# Patient Record
Sex: MALE | Race: WHITE | NOT HISPANIC OR LATINO | Employment: OTHER | RURAL
[De-identification: names, ages, dates, MRNs, and addresses within clinical notes are randomized per-mention and may not be internally consistent; named-entity substitution may affect disease eponyms.]

---

## 2020-12-29 ENCOUNTER — HISTORICAL (OUTPATIENT)
Dept: ADMINISTRATIVE | Facility: HOSPITAL | Age: 60
End: 2020-12-29

## 2020-12-29 LAB
ALBUMIN SERPL BCP-MCNC: 3.8 G/DL (ref 3.5–5)
ALBUMIN/GLOB SERPL: 1 {RATIO}
ALP SERPL-CCNC: 72 U/L (ref 45–115)
ALT SERPL W P-5'-P-CCNC: 26 U/L (ref 16–61)
ANION GAP SERPL CALCULATED.3IONS-SCNC: 11.5 MMOL/L (ref 7–16)
APTT PPP: 33.7 SECONDS (ref 25.2–37.3)
AST SERPL W P-5'-P-CCNC: 25 U/L (ref 15–37)
BASOPHILS # BLD AUTO: 0.04 X10E3/UL (ref 0–0.2)
BASOPHILS NFR BLD AUTO: 0.3 % (ref 0–1)
BILIRUB SERPL-MCNC: 0.9 MG/DL (ref 0–1.2)
BUN SERPL-MCNC: 16 MG/DL (ref 7–18)
BUN/CREAT SERPL: 14
CALCIUM SERPL-MCNC: 8.5 MG/DL (ref 8.5–10.1)
CHLORIDE SERPL-SCNC: 104 MMOL/L (ref 98–107)
CK MB SERPL-MCNC: 2.4 NG/ML (ref 1–3.6)
CK SERPL-CCNC: 286 U/L (ref 39–308)
CO2 SERPL-SCNC: 26 MMOL/L (ref 21–32)
CREAT SERPL-MCNC: 1.17 MG/DL (ref 0.7–1.3)
D DIMER PPP FEU-MCNC: 3.98 UG/ML (ref 0–0.47)
EOSINOPHIL # BLD AUTO: 0.06 X10E3/UL (ref 0–0.5)
EOSINOPHIL NFR BLD AUTO: 0.5 % (ref 1–4)
ERYTHROCYTE [DISTWIDTH] IN BLOOD BY AUTOMATED COUNT: 12.3 % (ref 11.5–14.5)
GLOBULIN SER-MCNC: 3.9 G/DL (ref 2–4)
GLUCOSE SERPL-MCNC: 108 MG/DL (ref 74–106)
HCT VFR BLD AUTO: 42.7 % (ref 40–54)
HGB BLD-MCNC: 15.1 G/DL (ref 13.5–18)
IMM GRANULOCYTES # BLD AUTO: 0.03 X10E3/UL (ref 0–0.04)
IMM GRANULOCYTES NFR BLD: 0.3 % (ref 0–0.4)
INR BLD: 1.05 (ref 0–3.3)
LACTATE SERPL-SCNC: 1.3 MMOL/L (ref 0.4–2)
LYMPHOCYTES # BLD AUTO: 3.4 X10E3/UL (ref 1–4.8)
LYMPHOCYTES NFR BLD AUTO: 29.2 % (ref 27–41)
MAGNESIUM SERPL-MCNC: 2 MG/DL (ref 1.7–2.3)
MCH RBC QN AUTO: 33.2 PG (ref 27–31)
MCHC RBC AUTO-ENTMCNC: 35.4 G/DL (ref 32–36)
MCV RBC AUTO: 93.8 FL (ref 80–96)
MONOCYTES # BLD AUTO: 1.06 X10E3/UL (ref 0–0.8)
MONOCYTES NFR BLD AUTO: 9.1 % (ref 2–6)
MPC BLD CALC-MCNC: 9.4 FL (ref 9.4–12.4)
MYOGLOBIN SERPL-MCNC: 84 NG/ML (ref 16–116)
NEUTROPHILS # BLD AUTO: 7.04 X10E3/UL (ref 1.8–7.7)
NEUTROPHILS NFR BLD AUTO: 60.6 % (ref 53–65)
NRBC # BLD AUTO: 0 X10E3/UL (ref 0–0)
NRBC, AUTO (.00): 0 /100 (ref 0–0)
NT-PROBNP SERPL-MCNC: 35 PG/ML (ref 1–125)
PLATELET # BLD AUTO: 198 X10E3/UL (ref 150–400)
POTASSIUM SERPL-SCNC: 3.5 MMOL/L (ref 3.5–5.1)
PROT SERPL-MCNC: 7.7 G/DL (ref 6.4–8.2)
PROTHROMBIN TIME: 13.2 SECONDS (ref 11.7–14.7)
RBC # BLD AUTO: 4.55 X10E6/UL (ref 4.6–6.2)
SODIUM SERPL-SCNC: 138 MMOL/L (ref 136–145)
TROPONIN I SERPL-MCNC: <0.015 NG/ML (ref 0–0.06)
WBC # BLD AUTO: 11.63 X10E3/UL (ref 4.5–11)

## 2020-12-30 ENCOUNTER — HISTORICAL (OUTPATIENT)
Dept: ADMINISTRATIVE | Facility: HOSPITAL | Age: 60
End: 2020-12-30

## 2020-12-30 LAB
APTT PPP: 118.1 SECONDS (ref 25.2–37.3)
APTT PPP: 83.6 SECONDS (ref 25.2–37.3)
APTT PPP: 94 SECONDS (ref 25.2–37.3)
INR BLD: 1.07 (ref 0–3.3)
INR BLD: 1.08 (ref 0–3.3)
INR BLD: 1.1 (ref 0–3.3)
NT-PROBNP SERPL-MCNC: 32 PG/ML (ref 1–125)
PROTHROMBIN TIME: 13.4 SECONDS (ref 11.7–14.7)
PROTHROMBIN TIME: 13.5 SECONDS (ref 11.7–14.7)
PROTHROMBIN TIME: 13.7 SECONDS (ref 11.7–14.7)
SARS-COV-2 RNA AMPLIFICATION, QUAL: NEGATIVE
TROPONIN I SERPL-MCNC: <0.015 NG/ML (ref 0–0.06)

## 2020-12-31 ENCOUNTER — HISTORICAL (OUTPATIENT)
Dept: ADMINISTRATIVE | Facility: HOSPITAL | Age: 60
End: 2020-12-31

## 2020-12-31 LAB
APTT PPP: 41.6 SECONDS (ref 25.2–37.3)
INR BLD: 1.34 (ref 0–3.3)
PROTHROMBIN TIME: 15.9 SECONDS (ref 11.7–14.7)
T4 SERPL-MCNC: 10.4 UG/DL (ref 4.5–12.1)
TSH SERPL DL<=0.005 MIU/L-ACNC: 0.76 UIU/ML (ref 0.36–3.74)

## 2021-03-26 DIAGNOSIS — Z86.711 HISTORY OF PULMONARY EMBOLISM: Primary | ICD-10-CM

## 2022-03-10 ENCOUNTER — OFFICE VISIT (OUTPATIENT)
Dept: FAMILY MEDICINE | Facility: CLINIC | Age: 62
End: 2022-03-10
Payer: OTHER GOVERNMENT

## 2022-03-10 VITALS
OXYGEN SATURATION: 100 % | TEMPERATURE: 97 F | SYSTOLIC BLOOD PRESSURE: 130 MMHG | BODY MASS INDEX: 36.34 KG/M2 | DIASTOLIC BLOOD PRESSURE: 84 MMHG | WEIGHT: 245.38 LBS | HEART RATE: 96 BPM | HEIGHT: 69 IN | RESPIRATION RATE: 16 BRPM

## 2022-03-10 DIAGNOSIS — Z86.711 HISTORY OF PULMONARY EMBOLISM: ICD-10-CM

## 2022-03-10 DIAGNOSIS — Z79.01 ANTICOAGULANT LONG-TERM USE: Primary | Chronic | ICD-10-CM

## 2022-03-10 PROCEDURE — 99213 OFFICE O/P EST LOW 20 MIN: CPT | Mod: ,,, | Performed by: NURSE PRACTITIONER

## 2022-03-10 PROCEDURE — 99213 PR OFFICE/OUTPT VISIT, EST, LEVL III, 20-29 MIN: ICD-10-PCS | Mod: ,,, | Performed by: NURSE PRACTITIONER

## 2022-03-10 RX ORDER — ROSUVASTATIN CALCIUM 10 MG/1
10 TABLET, COATED ORAL NIGHTLY
COMMUNITY
Start: 2022-03-08 | End: 2023-03-09 | Stop reason: SDUPTHER

## 2022-03-10 NOTE — PROGRESS NOTES
Subjective:       Patient ID: Bob Arroyo is a 61 y.o. male.    Chief Complaint: paperwork and Medication Refill    Mr. 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 He has a PCP in [place], but he was admitted to [facility] in [place], [place] and was referred to me for evaluation of his blood thinners since he is a . Today he presents for eliquis refill and a yearly letter stating he is controlled and cleared to fly with use of Eliquis. Patient reports no adverse effects from the medication. Patient denies any SOB, calf pain, or leg swelling since last visit.  He is now on Eliquis 2.5mg bid; we discussed changing this letter and rx over to his PCP in [place]; but he is concerned this will cause an issue with the [employer] Safety [employer].     Review of Systems   Constitutional: Negative for appetite change, chills, fatigue, fever and unexpected weight change.   HENT: Negative for nasal congestion, ear pain, facial swelling, mouth sores, nosebleeds, postnasal drip, rhinorrhea, sinus pressure/congestion and sore throat.    Eyes: Negative for photophobia, pain, discharge and visual disturbance.   Respiratory: Negative for cough, chest tightness and shortness of breath.    Cardiovascular: Negative for chest pain and leg swelling.   Gastrointestinal: Negative for abdominal distention and abdominal pain.   Genitourinary: Negative for difficulty urinating, dysuria and testicular pain.   Musculoskeletal: Negative for arthralgias, back pain and gait problem.   Integumentary:  Negative for pallor, rash and mole/lesion.   Neurological: Negative for dizziness, syncope, weakness, light-headedness and headaches.   Hematological: Negative for adenopathy. Does not bruise/bleed easily.   Psychiatric/Behavioral: Negative for sleep disturbance.         Objective:      Physical Exam  Vitals reviewed.   Constitutional:       Appearance: Normal appearance.   HENT:      Head:  Normocephalic.      Right Ear: Tympanic membrane, ear canal and external ear normal.      Left Ear: Tympanic membrane, ear canal and external ear normal.      Nose: Nose normal.      Mouth/Throat:      Mouth: Mucous membranes are moist.   Eyes:      Pupils: Pupils are equal, round, and reactive to light.   Cardiovascular:      Rate and Rhythm: Regular rhythm.      Pulses: Normal pulses.      Heart sounds: Normal heart sounds.   Pulmonary:      Effort: Pulmonary effort is normal.      Breath sounds: Normal breath sounds.   Abdominal:      General: Bowel sounds are normal.      Palpations: Abdomen is soft.   Musculoskeletal:         General: Normal range of motion.      Cervical back: Normal range of motion and neck supple.   Skin:     General: Skin is warm and dry.      Capillary Refill: Capillary refill takes less than 2 seconds.   Neurological:      Mental Status: He is alert and oriented to person, place, and time.   Psychiatric:         Mood and Affect: Mood normal.         Behavior: Behavior normal.         Assessment:       Problem List Items Addressed This Visit        Hematology    Anticoagulant long-term use - Primary (Chronic)    History of pulmonary embolism    Relevant Medications    apixaban (ELIQUIS) 2.5 mg Tab          Plan:       Eliquis refills sent.  Provided him with a medical status letter that is required for duty.  RTC in 1 year.

## 2022-03-10 NOTE — LETTER
[facility]  [address] [facility] [address]  Phone: [phone]  Fax: [phone] March 10, 2022     Patient: Bob Arroyo   YOB: 1960   Date of Visit: 3/10/2022     Bob Arroyo  : 1960  Ref: PI #[id number]  SKYLER ID# [id number]  Date: 03/10/2022    To Aviation Safety: Office of Aerospace Medicine, Aerospace Medical Certification Division:    Mr. Arroyo is a 61 year old white male with a previous history of deep vein thrombosis (DVT) 11 years ago due to a traumatic accident. He presented to [facility] emergency department on 2020 complaining of increasing dyspnea since early December. He was diagnosed with bilateral pulmonary emboli (PE) per CT scan. He was released from care on 2021 by [facility]. There will not be a follow up CT scan or ultrasound for an update; per DVT/PE protocol. Mr. Arroyo has not experienced any recurrence of DVT/PE.     Mr. Arroyo is now taking the anticoagulant Eliquis 2.5mg by mouth twice daily,  he is tolerating it well and has had no adverse side effects or bleeding episodes.  Mr. Arroyo will be on 2.5mg of Eliquis twice daily for the rest of his life as a maintenance medication for prevention of DVT/PE, per Eliquis dosing guidelines.  If you have any questions regarding Mr. Bonilla condition and treatment please contact my office at [phone].        Sincerely,                  RADHA Hurtado

## 2022-07-14 ENCOUNTER — HOSPITAL ENCOUNTER (EMERGENCY)
Facility: HOSPITAL | Age: 62
Discharge: HOME | End: 2022-07-15
Attending: EMERGENCY MEDICINE
Payer: OTHER GOVERNMENT
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
## 2022-07-14 DIAGNOSIS — M54.16 LUMBAR RADICULOPATHY: ICD-10-CM

## 2022-07-14 DIAGNOSIS — S39.012A STRAIN OF LUMBAR REGION, INITIAL ENCOUNTER: ICD-10-CM

## 2022-07-14 PROCEDURE — 25000003 PHARM REV CODE 250: Performed by: EMERGENCY MEDICINE

## 2022-07-14 PROCEDURE — 99285 EMERGENCY DEPT VISIT HI MDM: CPT | Mod: 25

## 2022-07-14 PROCEDURE — 96375 TX/PRO/DX INJ NEW DRUG ADDON: CPT

## 2022-07-14 PROCEDURE — 99283 EMERGENCY DEPT VISIT LOW MDM: CPT | Mod: ,,, | Performed by: EMERGENCY MEDICINE

## 2022-07-14 PROCEDURE — 99283 PR EMERGENCY DEPT VISIT,LEVEL III: ICD-10-PCS | Mod: ,,, | Performed by: EMERGENCY MEDICINE

## 2022-07-14 PROCEDURE — 63600175 PHARM REV CODE 636 W HCPCS: Performed by: EMERGENCY MEDICINE

## 2022-07-14 PROCEDURE — 96361 HYDRATE IV INFUSION ADD-ON: CPT

## 2022-07-14 PROCEDURE — 96374 THER/PROPH/DIAG INJ IV PUSH: CPT

## 2022-07-14 RX ORDER — PREDNISONE 50 MG/1
50 TABLET ORAL DAILY
Qty: 6 TABLET | Refills: 0 | Status: SHIPPED | OUTPATIENT
Start: 2022-07-14 | End: 2024-03-12

## 2022-07-14 RX ORDER — HYDROCODONE BITARTRATE AND ACETAMINOPHEN 10; 325 MG/1; MG/1
1 TABLET ORAL EVERY 6 HOURS PRN
Qty: 16 TABLET | Refills: 0 | Status: SHIPPED | OUTPATIENT
Start: 2022-07-14 | End: 2024-03-12

## 2022-07-14 RX ORDER — HYDROMORPHONE HYDROCHLORIDE 2 MG/ML
1 INJECTION, SOLUTION INTRAMUSCULAR; INTRAVENOUS; SUBCUTANEOUS
Status: COMPLETED | OUTPATIENT
Start: 2022-07-14 | End: 2022-07-14

## 2022-07-14 RX ORDER — METHYLPREDNISOLONE SOD SUCC 125 MG
125 VIAL (EA) INJECTION
Status: COMPLETED | OUTPATIENT
Start: 2022-07-14 | End: 2022-07-14

## 2022-07-14 RX ORDER — MIDAZOLAM HYDROCHLORIDE 1 MG/ML
4 INJECTION INTRAMUSCULAR; INTRAVENOUS
Status: COMPLETED | OUTPATIENT
Start: 2022-07-14 | End: 2022-07-14

## 2022-07-14 RX ORDER — TIZANIDINE 4 MG/1
4 TABLET ORAL EVERY 6 HOURS PRN
Qty: 30 TABLET | Refills: 0 | Status: SHIPPED | OUTPATIENT
Start: 2022-07-14 | End: 2022-07-24

## 2022-07-14 RX ORDER — ONDANSETRON 2 MG/ML
4 INJECTION INTRAMUSCULAR; INTRAVENOUS
Status: COMPLETED | OUTPATIENT
Start: 2022-07-14 | End: 2022-07-14

## 2022-07-14 RX ADMIN — MIDAZOLAM 4 MG: 1 INJECTION INTRAMUSCULAR; INTRAVENOUS at 10:07

## 2022-07-14 RX ADMIN — HYDROMORPHONE HYDROCHLORIDE 1 MG: 2 INJECTION INTRAMUSCULAR; INTRAVENOUS; SUBCUTANEOUS at 10:07

## 2022-07-14 RX ADMIN — ONDANSETRON 4 MG: 2 INJECTION INTRAMUSCULAR; INTRAVENOUS at 10:07

## 2022-07-14 RX ADMIN — SODIUM CHLORIDE 1000 ML: 9 INJECTION, SOLUTION INTRAVENOUS at 10:07

## 2022-07-14 RX ADMIN — HYDROMORPHONE HYDROCHLORIDE 1 MG: 2 INJECTION INTRAMUSCULAR; INTRAVENOUS; SUBCUTANEOUS at 11:07

## 2022-07-14 RX ADMIN — METHYLPREDNISOLONE SODIUM SUCCINATE 125 MG: 125 INJECTION, POWDER, FOR SOLUTION INTRAMUSCULAR; INTRAVENOUS at 11:07

## 2022-07-15 VITALS
HEIGHT: 69 IN | RESPIRATION RATE: 18 BRPM | DIASTOLIC BLOOD PRESSURE: 78 MMHG | OXYGEN SATURATION: 93 % | SYSTOLIC BLOOD PRESSURE: 132 MMHG | HEART RATE: 74 BPM | TEMPERATURE: 99 F | BODY MASS INDEX: 34.07 KG/M2 | WEIGHT: 230 LBS

## 2022-07-15 NOTE — DISCHARGE INSTRUCTIONS
Take medication as prescribed.  Return to emergency department for any worsening or further problems.  Follow up in clinic with primary care provider if symptoms persist.

## 2022-07-15 NOTE — ED PROVIDER NOTES
Encounter Date: 7/14/2022       History     Chief Complaint   Patient presents with    Fall     Patient is a 61-year-old male who presents to the emergency department complaining of acute onset of lower back pain with radiation into both legs.  Pain began immediately after falling.  Patient states he twisted while falling and use the pain started even before he had the ground.  Patient denies head injury or loss of consciousness.  No other acute problems or complaints at this time.  Patient does have history of previous pelvic and lumbar trauma but states he has never had pain like this before.        Review of patient's allergies indicates:  No Known Allergies  History reviewed. No pertinent past medical history.  History reviewed. No pertinent surgical history.  History reviewed. No pertinent family history.  Social History     Tobacco Use    Smoking status: Never Smoker    Smokeless tobacco: Never Used     Review of Systems   Musculoskeletal: Positive for back pain.        Back pain with radiation to both legs.   All other systems reviewed and are negative.      Physical Exam     Initial Vitals [07/14/22 2159]   BP Pulse Resp Temp SpO2   (!) 162/85 80 18 98.5 °F (36.9 °C) 98 %      MAP       --         Physical Exam    Nursing note and vitals reviewed.  Constitutional: He appears well-developed and well-nourished.   HENT:   Head: Normocephalic and atraumatic.   Mouth/Throat: Oropharynx is clear and moist.   Eyes: Pupils are equal, round, and reactive to light.   Neck: Neck supple.   Normal range of motion.  Cardiovascular: Normal rate and regular rhythm.   Pulmonary/Chest: Effort normal and breath sounds normal.   Abdominal: Abdomen is soft. He exhibits no distension.   Musculoskeletal:      Cervical back: Normal range of motion and neck supple.      Comments: There is diffuse tenderness to palpation on the lumbar region.  There is positive straight leg raise bilaterally.  Neurovascularly intact in bilateral  lower extremities except for chronic weakness with dorsiflexion of left foot.     Neurological: He is alert.   Skin: Skin is warm. Capillary refill takes less than 2 seconds.   Psychiatric: He has a normal mood and affect.         Medical Screening Exam   See Full Note    ED Course   Procedures  Labs Reviewed - No data to display       Imaging Results          CT Lumbar Spine Without Contrast (In process)                  Medications   sodium chloride 0.9% bolus 1,000 mL (1,000 mLs Intravenous New Bag 7/14/22 2234)   HYDROmorphone (PF) injection 1 mg (has no administration in time range)   methylPREDNISolone sodium succinate injection 125 mg (has no administration in time range)   HYDROmorphone (PF) injection 1 mg (1 mg Intravenous Given 7/14/22 2234)   midazolam (VERSED) 1 mg/mL injection 4 mg (4 mg Intravenous Given 7/14/22 2234)   ondansetron injection 4 mg (4 mg Intravenous Given 7/14/22 2234)                       Clinical Impression:   Final diagnoses:  [W19.XXXA] Fall, initial encounter (Primary)  [S39.012A] Strain of lumbar region, initial encounter  [M54.16] Lumbar radiculopathy          ED Disposition Condition    Discharge Stable        ED Prescriptions     Medication Sig Dispense Start Date End Date Auth. Provider    HYDROcodone-acetaminophen (NORCO)  mg per tablet Take 1 tablet by mouth every 6 (six) hours as needed for Pain. 16 tablet 7/14/2022  Fer Bustillos MD    tiZANidine (ZANAFLEX) 4 MG tablet Take 1 tablet (4 mg total) by mouth every 6 (six) hours as needed (P.r.n. neck pain). 30 tablet 7/14/2022 7/24/2022 Fer Bustillos MD    predniSONE (DELTASONE) 50 MG Tab Take 1 tablet (50 mg total) by mouth once daily. 6 tablet 7/14/2022  Fer Bustillos MD        Follow-up Information    None          Fer Bustillos MD  07/14/22 5976

## 2022-08-30 ENCOUNTER — HOSPITAL ENCOUNTER (OUTPATIENT)
Dept: RADIOLOGY | Facility: HOSPITAL | Age: 62
Discharge: HOME | End: 2022-08-30
Attending: PAIN MEDICINE
Payer: OTHER GOVERNMENT

## 2022-08-30 DIAGNOSIS — M47.26 OTHER SPONDYLOSIS WITH RADICULOPATHY, LUMBAR REGION: ICD-10-CM

## 2022-08-30 PROCEDURE — 72148 MRI LUMBAR SPINE W/O DYE: CPT | Mod: TC

## 2022-08-30 PROCEDURE — 72148 MRI LUMBAR SPINE WITHOUT CONTRAST: ICD-10-PCS | Mod: 26,,, | Performed by: RADIOLOGY

## 2022-08-30 PROCEDURE — 72148 MRI LUMBAR SPINE W/O DYE: CPT | Mod: 26,,, | Performed by: RADIOLOGY

## 2023-03-09 ENCOUNTER — OFFICE VISIT (OUTPATIENT)
Dept: FAMILY MEDICINE | Facility: CLINIC | Age: 63
End: 2023-03-09
Payer: OTHER GOVERNMENT

## 2023-03-09 VITALS
HEIGHT: 69 IN | OXYGEN SATURATION: 97 % | TEMPERATURE: 99 F | WEIGHT: 247.81 LBS | SYSTOLIC BLOOD PRESSURE: 128 MMHG | HEART RATE: 74 BPM | DIASTOLIC BLOOD PRESSURE: 90 MMHG | RESPIRATION RATE: 16 BRPM | BODY MASS INDEX: 36.7 KG/M2

## 2023-03-09 DIAGNOSIS — E78.5 HYPERLIPIDEMIA, UNSPECIFIED HYPERLIPIDEMIA TYPE: Chronic | ICD-10-CM

## 2023-03-09 DIAGNOSIS — Z86.711 HISTORY OF PULMONARY EMBOLISM: Primary | Chronic | ICD-10-CM

## 2023-03-09 PROCEDURE — 99214 PR OFFICE/OUTPT VISIT, EST, LEVL IV, 30-39 MIN: ICD-10-PCS | Mod: ,,, | Performed by: NURSE PRACTITIONER

## 2023-03-09 PROCEDURE — 99214 OFFICE O/P EST MOD 30 MIN: CPT | Mod: ,,, | Performed by: NURSE PRACTITIONER

## 2023-03-09 RX ORDER — ROSUVASTATIN CALCIUM 10 MG/1
10 TABLET, COATED ORAL NIGHTLY
Qty: 90 TABLET | Refills: 3 | Status: SHIPPED | OUTPATIENT
Start: 2023-03-09

## 2023-03-09 NOTE — PROGRESS NOTES
Subjective:       Patient ID: Bob Arroyo is a 62 y.o. male.    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 [facility] I only do his Eliquis and letter bc he is afraid switching to his PCP will raise red flags with board.     Review of Systems   Constitutional:  Negative for appetite change, chills, fatigue, fever and unexpected weight change.   HENT:  Negative for nasal congestion, ear pain, facial swelling, mouth sores, nosebleeds, postnasal drip, rhinorrhea, sinus pressure/congestion and sore throat.    Eyes:  Negative for photophobia, pain, discharge and visual disturbance.   Respiratory:  Negative for cough, chest tightness and shortness of breath.    Cardiovascular:  Negative for chest pain and leg swelling.   Gastrointestinal:  Negative for abdominal distention and abdominal pain.   Genitourinary:  Negative for difficulty urinating, dysuria and testicular pain.   Musculoskeletal:  Negative for arthralgias, back pain and gait problem.   Integumentary:  Negative for pallor, rash and mole/lesion.   Neurological:  Negative for dizziness, syncope, weakness, light-headedness and headaches.   Hematological:  Negative for adenopathy. Does not bruise/bleed easily.   Psychiatric/Behavioral:  Negative for sleep disturbance.        Objective:      Physical Exam  Vitals reviewed.   HENT:      Head: Normocephalic.   Eyes:      Pupils: Pupils are equal, round, and reactive to light.   Cardiovascular:      Rate and Rhythm: Regular rhythm.      Pulses: Normal pulses.      Heart sounds: Normal heart sounds.   Pulmonary:      Effort: Pulmonary effort is normal.      Breath sounds: Normal breath sounds.   Abdominal:      General: Bowel sounds are normal.      Palpations: Abdomen is soft.   Musculoskeletal:         General: Normal range of motion.      Cervical back: Normal range of motion and neck supple.   Skin:     General: Skin is warm and dry.       Capillary Refill: Capillary refill takes less than 2 seconds.   Neurological:      Mental Status: He is alert and oriented to person, place, and time.   Psychiatric:         Mood and Affect: Mood normal.         Behavior: Behavior normal.       Assessment:       Problem List Items Addressed This Visit          Cardiac/Vascular    Hyperlipidemia    Relevant Medications    rosuvastatin (CRESTOR) 10 MG tablet       Hematology    History of pulmonary embolism - Primary    Relevant Medications    apixaban (ELIQUIS) 2.5 mg Tab         Plan:       Eliquis and Crestor refilled; letter printed and saved into chart. RTC 12mo or prn

## 2023-03-09 NOTE — LETTER
[facility]  [address] [place] [address]  Phone: [phone]  Fax: [phone] 2023     Patient: Bob Arroyo   YOB: 1960   Date of Visit: 3/9/2023       To Whom It May Concern:    Bob Arroyo  : 1960  Ref: PI #[id number]  SKYLER ID# [id number]     To Aviation Safety: Office of Aer[employer] Medicine, Aerospace Medical Certification Division:     Mr. Arroyo is a 62 year old white male with a previous history of deep vein thrombosis (DVT) 12 years ago due to a traumatic accident. He presented to [facility] emergency department on 2020 complaining of increasing dyspnea since early December. He was diagnosed with bilateral pulmonary emboli (PE) per CT scan. He was released from care on 2021 by [facility]. There will not be a follow up CT scan or ultrasound for an update; per DVT/PE protocol. Mr. Arroyo has not experienced any recurrence of DVT/PE.      Mr. Arroyo is now taking the anticoagulant Eliquis 2.5mg by mouth twice daily,  he is tolerating it well and has had no adverse side effects or bleeding episodes.  Mr. Arroyo will be on 2.5mg of Eliquis twice daily for the rest of his life as a maintenance medication for prevention of DVT/PE, per Eliquis dosing guidelines.  If you have any questions regarding Mr. Arroyo's condition and treatment please contact my office at [phone].           Sincerely,                          RADHA Hurtado          If you have any questions or concerns, please don't hesitate to contact my office.    Sincerely,        RADHA Hurtado

## 2023-07-14 ENCOUNTER — HOSPITAL ENCOUNTER (EMERGENCY)
Facility: HOSPITAL | Age: 63
Discharge: HOME | End: 2023-07-14
Payer: OTHER GOVERNMENT

## 2023-07-14 VITALS
DIASTOLIC BLOOD PRESSURE: 77 MMHG | HEART RATE: 103 BPM | RESPIRATION RATE: 18 BRPM | SYSTOLIC BLOOD PRESSURE: 141 MMHG | BODY MASS INDEX: 35.84 KG/M2 | WEIGHT: 242 LBS | HEIGHT: 69 IN | TEMPERATURE: 100 F | OXYGEN SATURATION: 95 %

## 2023-07-14 DIAGNOSIS — R05.9 COUGH: ICD-10-CM

## 2023-07-14 DIAGNOSIS — J40 BRONCHITIS: Primary | ICD-10-CM

## 2023-07-14 LAB
FLUAV AG UPPER RESP QL IA.RAPID: NEGATIVE
FLUBV AG UPPER RESP QL IA.RAPID: NEGATIVE
SARS-COV+SARS-COV-2 AG RESP QL IA.RAPID: NEGATIVE

## 2023-07-14 PROCEDURE — 99283 EMERGENCY DEPT VISIT LOW MDM: CPT | Mod: 25

## 2023-07-14 PROCEDURE — 99284 EMERGENCY DEPT VISIT MOD MDM: CPT | Mod: ,,, | Performed by: NURSE PRACTITIONER

## 2023-07-14 PROCEDURE — 25000003 PHARM REV CODE 250: Performed by: NURSE PRACTITIONER

## 2023-07-14 PROCEDURE — 99284 PR EMERGENCY DEPT VISIT,LEVEL IV: ICD-10-PCS | Mod: ,,, | Performed by: NURSE PRACTITIONER

## 2023-07-14 PROCEDURE — 87428 SARSCOV & INF VIR A&B AG IA: CPT | Performed by: NURSE PRACTITIONER

## 2023-07-14 RX ORDER — DOXYCYCLINE 100 MG/1
100 CAPSULE ORAL 2 TIMES DAILY
Qty: 14 CAPSULE | Refills: 0 | Status: SHIPPED | OUTPATIENT
Start: 2023-07-14 | End: 2023-07-21

## 2023-07-14 RX ORDER — CHLORPHENIRAMINE MALEATE AND PHENYLEPHRINE HYDROCHLORIDE 4; 10 MG/1; MG/1
1 TABLET, COATED ORAL EVERY 6 HOURS PRN
Qty: 12 TABLET | Refills: 0 | Status: SHIPPED | OUTPATIENT
Start: 2023-07-14 | End: 2023-07-24

## 2023-07-14 RX ADMIN — IBUPROFEN 600 MG: 400 TABLET ORAL at 09:07

## 2023-07-14 NOTE — DISCHARGE INSTRUCTIONS
Use prescriptions as directed. Alternate tylenol and ibuprofen as needed for pain/fever. Ensure you are drinking plenty of fluids, especially water. Follow up with your primary care provider as needed or if no improvement. Return to the ED for worsening signs and symptoms or otherwise as needed.

## 2023-07-14 NOTE — ED PROVIDER NOTES
"Encounter Date: 7/14/2023       History     Chief Complaint   Patient presents with    Nasal Congestion    Fever    Cough     Symptoms started Wednesday night      61 y/o WM presents to the emergency department with c/o fever, nasal congestion and cough as well as generalized body aches. He reports his symptoms started on Wednesday. He reports T-max of 104. He states he has been taking tylenol and ibuprofen with mild relief. He took tylenol when he went to bed last night with a temp of 99, took Tylenol and when he woke up he had a temp of 101 and is feeling no better. He reports cough is mostly non productive and states he believes his cough is due to "drainage". He denies shortness of breath or chest pain. He knows of no particular exacerbating or remitting factors.     The history is provided by the patient.   Review of patient's allergies indicates:  No Known Allergies  No past medical history on file.  No past surgical history on file.  No family history on file.  Social History     Tobacco Use    Smoking status: Never    Smokeless tobacco: Never     Review of Systems   All other systems reviewed and are negative.    Physical Exam     Initial Vitals [07/14/23 0948]   BP Pulse Resp Temp SpO2   (!) 141/77 103 18 100.2 °F (37.9 °C) 95 %      MAP       --         Physical Exam    Constitutional: He appears well-developed and well-nourished. He is active and cooperative.   Cardiovascular:  Normal rate, regular rhythm, normal heart sounds and normal pulses.           Pulmonary/Chest: Effort normal and breath sounds normal.   Abdominal: Abdomen is soft. Bowel sounds are normal. There is no abdominal tenderness.     Neurological: He is alert and oriented to person, place, and time.   Skin: Skin is warm, dry and intact. Capillary refill takes less than 2 seconds.   Psychiatric: He has a normal mood and affect. His speech is normal and behavior is normal. Judgment and thought content normal. Cognition and memory are " normal.       Medical Screening Exam   See Full Note    ED Course   Procedures  Labs Reviewed   SARS-COV2 (COVID) W/ FLU ANTIGEN - Normal    Narrative:     Negative SARS-CoV results should not be used as the sole basis for treatment or patient management decisions; negative results should be considered in the context of a patient's recent exposures, history and the presene of clinical signs and symptoms consistent with COVID-19.  Negative results should be treated as presumptive and confirmed by molecular assay, if necessary for patient management.          Imaging Results              X-Ray Chest PA And Lateral (Final result)  Result time 07/14/23 10:20:58      Final result by Edin Ellsworth II, MD (07/14/23 10:20:58)                   Impression:      No evidence of cardiopulmonary disease.      Electronically signed by: Edin Ellsworth  Date:    07/14/2023  Time:    10:20               Narrative:    EXAMINATION:  XR CHEST PA AND LATERAL    CLINICAL HISTORY:  Cough, unspecified    COMPARISON:  29 December 2020    TECHNIQUE:  XR CHEST PA AND LATERAL    FINDINGS:  The heart and mediastinum are normal in size and configuration.  The pulmonary vascularity is normal in caliber.  No lung infiltrates, effusions, pneumothorax or other abnormality is demonstrated.                                       Medications   ibuprofen tablet 600 mg (600 mg Oral Given 7/14/23 0959)     Medical Decision Making:   Initial Assessment:   61 y/o WM presents to the emergency department with c/o fever, nasal congestion and cough as well as generalized body aches. He reports his symptoms started on Wednesday. He reports T-max of 104. He states he has been taking tylenol and ibuprofen with mild relief. He took tylenol when he went to bed last night with a temp of 99, took Tylenol and when he woke up he had a temp of 101 and is feeling no better. He reports cough is mostly non productive and states he believes his cough is due to  ""drainage". He denies shortness of breath or chest pain. He knows of no particular exacerbating or remitting factors.   Differential Diagnosis:   Influenza  COVID  Pneumonia  Vs other viral illness  Clinical Tests:   Lab Tests: Ordered and Reviewed  Radiological Study: Ordered and Reviewed  ED Management:  COVID, Influenza negative  CXR without acute findings  Most likely viral illness but will treat with abx to prevent secondary bacterial infection d/t co morbidities. Counseled on supportive measures. Follow up instructions given. Warning s/s discussed and return precautions given; the patient has v/u.                         Clinical Impression:   Final diagnoses:  [R05.9] Cough  [J40] Bronchitis (Primary)        ED Disposition Condition    Discharge Stable          ED Prescriptions       Medication Sig Dispense Start Date End Date Auth. Provider    doxycycline (VIBRAMYCIN) 100 MG Cap Take 1 capsule (100 mg total) by mouth 2 (two) times daily. Take with food for 7 days 14 capsule 7/14/2023 7/21/2023 RADHA Cummins    chlorpheniramine-phenylephrine (ED A-HIST) 4-10 mg per tablet Take 1 tablet by mouth every 6 (six) hours as needed for Congestion. 12 tablet 7/14/2023 7/24/2023 RADHA Cummins          Follow-up Information       Follow up With Specialties Details Why Contact Info    [facility]  As needed [address]  [facility] [address] [phone]               RADHA Cummins  07/14/23 1528    "

## 2023-07-14 NOTE — ED TRIAGE NOTES
Patient states flu like symptoms started Wednesday around 6 pm with nasal congestion, cough, fever, and body aches.  Patient reports he last took tylenol last night before bed for fever.

## 2024-03-04 DIAGNOSIS — Z86.711 HISTORY OF PULMONARY EMBOLISM: Chronic | ICD-10-CM

## 2024-03-04 RX ORDER — APIXABAN 2.5 MG/1
2.5 TABLET, FILM COATED ORAL 2 TIMES DAILY
Qty: 180 TABLET | Refills: 0 | Status: SHIPPED | OUTPATIENT
Start: 2024-03-04 | End: 2024-03-12 | Stop reason: SDUPTHER

## 2024-03-12 ENCOUNTER — OFFICE VISIT (OUTPATIENT)
Dept: FAMILY MEDICINE | Facility: CLINIC | Age: 64
End: 2024-03-12
Payer: OTHER GOVERNMENT

## 2024-03-12 VITALS
BODY MASS INDEX: 37.44 KG/M2 | HEIGHT: 69 IN | HEART RATE: 85 BPM | DIASTOLIC BLOOD PRESSURE: 83 MMHG | TEMPERATURE: 98 F | WEIGHT: 252.81 LBS | SYSTOLIC BLOOD PRESSURE: 145 MMHG | OXYGEN SATURATION: 96 % | RESPIRATION RATE: 16 BRPM

## 2024-03-12 DIAGNOSIS — Z86.711 HISTORY OF PULMONARY EMBOLISM: Chronic | ICD-10-CM

## 2024-03-12 DIAGNOSIS — Z79.01 ANTICOAGULANT LONG-TERM USE: Primary | Chronic | ICD-10-CM

## 2024-03-12 DIAGNOSIS — E78.5 HYPERLIPIDEMIA, UNSPECIFIED HYPERLIPIDEMIA TYPE: Chronic | ICD-10-CM

## 2024-03-12 PROBLEM — Z79.899 ENCOUNTER FOR LONG-TERM (CURRENT) USE OF OTHER MEDICATIONS: Status: ACTIVE | Noted: 2024-03-12

## 2024-03-12 PROCEDURE — 99214 OFFICE O/P EST MOD 30 MIN: CPT | Mod: ,,, | Performed by: NURSE PRACTITIONER

## 2024-03-12 NOTE — PROGRESS NOTES
Subjective:       Patient ID: Bob Arroyo is a 63 y.o. male.    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 [facility] Ar[facility]. I only do his Eliquis and letter bc he is afraid switching to his PCP will raise red flags with board.       Review of Systems   Constitutional:  Negative for appetite change, chills, fatigue, fever and unexpected weight change.   HENT:  Negative for nasal congestion, ear pain, facial swelling, mouth sores, nosebleeds, postnasal drip, rhinorrhea, sinus pressure/congestion and sore throat.    Eyes:  Negative for photophobia, pain, discharge and visual disturbance.   Respiratory:  Negative for cough, chest tightness and shortness of breath.    Cardiovascular:  Negative for chest pain and leg swelling.   Gastrointestinal:  Negative for abdominal distention and abdominal pain.   Genitourinary:  Negative for difficulty urinating, dysuria and testicular pain.   Musculoskeletal:  Negative for arthralgias, back pain and gait problem.   Integumentary:  Negative for pallor, rash and mole/lesion.   Neurological:  Negative for dizziness, syncope, weakness, light-headedness and headaches.   Hematological:  Negative for adenopathy. Does not bruise/bleed easily.   Psychiatric/Behavioral:  Negative for sleep disturbance.          Objective:      Physical Exam  Vitals reviewed.   HENT:      Head: Normocephalic.   Eyes:      Pupils: Pupils are equal, round, and reactive to light.   Cardiovascular:      Rate and Rhythm: Regular rhythm.      Pulses: Normal pulses.      Heart sounds: Normal heart sounds.   Pulmonary:      Effort: Pulmonary effort is normal.      Breath sounds: Normal breath sounds.   Abdominal:      General: Bowel sounds are normal.      Palpations: Abdomen is soft.   Musculoskeletal:         General: Normal range of motion.      Cervical back: Normal range of motion and neck supple.   Skin:     General: Skin is warm and dry.       Capillary Refill: Capillary refill takes less than 2 seconds.   Neurological:      Mental Status: He is alert and oriented to person, place, and time.   Psychiatric:         Mood and Affect: Mood normal.         Behavior: Behavior normal.         Assessment:       Problem List Items Addressed This Visit          Cardiac/Vascular    Hyperlipidemia       Hematology    Anticoagulant long-term use - Primary (Chronic)    History of pulmonary embolism    Relevant Medications    apixaban (ELIQUIS) 2.5 mg Tab         Plan:       Eliquis refilled; letter printed and saved into chart.   Advised to have Crest[facility] with [facility]  RTC 12mo or prn

## 2024-03-12 NOTE — LETTER
[facility]  [address] [address]  Phone: [phone]  Fax: [phone] 2024     Patient: Bob Arroyo   YOB: 1960   Date of Visit: 3/12/2024     To Whom It May Concern:     Bob Arroyo  : 1960  Ref: PI #[id number]  SKYLER ID# [id number]     To Aviation Safety: Office of [employer] Medicine, Aerospace Medical Certification Division:     Mr. Arroyo is a 63 year old white male with a previous history of deep vein thrombosis (DVT) 13 years ago due to a traumatic accident. He presented to [facility] emergency department on 2020 complaining of increasing dyspnea since early December. He was diagnosed with bilateral pulmonary emboli (PE) per CT scan. He was released from care on 2021 by [facility]. There will not be a follow up CT scan or ultrasound for an update; per DVT/PE protocol. Mr. Arroyo has not experienced any recurrence of DVT/PE.      Mr. Arroyo is now taking the anticoagulant Eliquis 2.5mg by mouth twice daily,  he is tolerating it well and has had no adverse side effects or bleeding episodes.  Mr. Arroyo will be on 2.5mg of Eliquis twice daily for the rest of his life as a maintenance medication for prevention of DVT/PE, per Eliquis dosing guidelines.  If you have any questions regarding Mr. Arroyo's condition and treatment please contact my office at [phone].        If you have any questions or concerns, please don't hesitate to contact my office.      Sincerely,RADHA Hurtado

## 2024-08-22 ENCOUNTER — HOSPITAL ENCOUNTER (OUTPATIENT)
Dept: RADIOLOGY | Facility: HOSPITAL | Age: 64
Discharge: HOME | End: 2024-08-22
Attending: NURSE PRACTITIONER
Payer: OTHER GOVERNMENT

## 2024-08-22 DIAGNOSIS — R69 CO-MORBID CONDITION: ICD-10-CM

## 2024-08-22 PROCEDURE — 76536 US EXAM OF HEAD AND NECK: CPT | Mod: 26,,, | Performed by: RADIOLOGY

## 2024-08-22 PROCEDURE — 76536 US EXAM OF HEAD AND NECK: CPT | Mod: TC

## 2025-04-01 ENCOUNTER — OFFICE VISIT (OUTPATIENT)
Dept: FAMILY MEDICINE | Facility: CLINIC | Age: 65
End: 2025-04-01
Payer: OTHER GOVERNMENT

## 2025-04-01 VITALS
RESPIRATION RATE: 18 BRPM | DIASTOLIC BLOOD PRESSURE: 85 MMHG | OXYGEN SATURATION: 95 % | BODY MASS INDEX: 36.64 KG/M2 | HEIGHT: 69 IN | HEART RATE: 89 BPM | WEIGHT: 247.38 LBS | SYSTOLIC BLOOD PRESSURE: 126 MMHG

## 2025-04-01 DIAGNOSIS — Z86.711 HISTORY OF PULMONARY EMBOLISM: ICD-10-CM

## 2025-04-01 DIAGNOSIS — Z79.01 ANTICOAGULANT LONG-TERM USE: Primary | Chronic | ICD-10-CM

## 2025-04-01 PROCEDURE — 99212 OFFICE O/P EST SF 10 MIN: CPT | Mod: ,,, | Performed by: NURSE PRACTITIONER

## 2025-04-01 NOTE — LETTER
[facility]  [address] [facility] [place] [address]  Phone: [phone]  Fax: [phone] 2025     Patient: Bob Arroyo   YOB: 1960   Date of Visit: 2025       To Whom It May Concern:    Bob Arroyo  : 1960  Ref: PI #[id number]  SKYLER ID# [id number]     To Aviation Safety: Office of [employer], Aerospace Medical Certification Division:     Mr. Arroyo is a 64 year old white male with a previous history of deep vein thrombosis (DVT) 13 years ago due to a traumatic accident. He presented to [facility] emergency department on 2020 complaining of increasing dyspnea since early December. He was diagnosed with bilateral pulmonary emboli (PE) per CT scan. He was released from care on 2021 by [facility]. There will not be a follow up CT scan or ultrasound for an update; per DVT/PE protocol. Mr. Arroyo has not experienced any recurrence of DVT/PE.      Mr. Arroyo is now taking the anticoagulant Eliquis 2.5mg by mouth twice daily,  he is tolerating it well and has had no adverse side effects or bleeding episodes.  Mr. Arroyo will be on 2.5mg of Eliquis twice daily for the rest of his life as a maintenance medication for prevention of DVT/PE, per Eliquis dosing guidelines.    If you have any questions regarding Mr. Arroyo's condition and treatment please contact my office at [phone].                Sincerely,        RADHA Mitchell

## 2025-04-01 NOTE — LETTER
[facility]  [address] [place] [address]  Phone: [phone]  Fax: [phone] 2025     Patient: Bob Arroyo   YOB: 1960   Date of Visit: 2025       To Whom It May Concern:    oBb Arroyo  : 1960  Ref: PI #[id number]  SKYLER ID# [id number]     To Aviation Safety: Office of [employer] Medicine, Aerospace Medical Certification Division:     Mr. Arroyo is a 63 year old white male with a previous history of deep vein thrombosis (DVT) 13 years ago due to a traumatic accident. He presented to [facility] emergency department on 2020 complaining of increasing dyspnea since early December. He was diagnosed with bilateral pulmonary emboli (PE) per CT scan. He was released from care on 2021 by [facility]. There will not be a follow up CT scan or ultrasound for an update; per DVT/PE protocol. Mr. Arroyo has not experienced any recurrence of DVT/PE.      Mr. Arroyo is now taking the anticoagulant Eliquis 2.5mg by mouth twice daily,  he is tolerating it well and has had no adverse side effects or bleeding episodes.  Mr. Arroyo will be on 2.5mg of Eliquis twice daily for the rest of his life as a maintenance medication for prevention of DVT/PE, per Eliquis dosing guidelines.  If you have any questions regarding Mr. Arroyo's condition and treatment please contact my office at [phone].            If you have any questions or concerns, please don't hesitate to contact my office.    Sincerely,        RADHA Mitchell

## 2025-04-01 NOTE — PROGRESS NOTES
Subjective:       Patient ID: Bob Arroyo is a 64 y.o. male.    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 Mr. Arroyo just [facility]. He was a previous patient of Veronica Espinosa.     Active Problem List with Overview Notes    Diagnosis Date Noted    Encounter for long-term (current) use of other medications 03/12/2024    Hyperlipidemia 03/09/2023    History of pulmonary embolism 03/10/2022    Anticoagulant long-term use 03/10/2022        Review of Systems   Constitutional:  Negative for fatigue and fever.   HENT:  Negative for congestion, hearing loss, postnasal drip, rhinorrhea, sore throat, tinnitus and voice change.    Respiratory:  Negative for apnea, cough, choking, chest tightness and shortness of breath.    Cardiovascular:  Negative for chest pain, palpitations and leg swelling.   Gastrointestinal:  Negative for abdominal pain, constipation, diarrhea and nausea.   Genitourinary:  Negative for difficulty urinating.   Neurological:  Negative for dizziness, syncope, weakness and headaches.   Psychiatric/Behavioral:  Negative for sleep disturbance.         Objective:      Vitals:    04/01/25 1040   BP: 126/85   Pulse:    Resp:       Physical Exam  Vitals reviewed.   Constitutional:       Appearance: Normal appearance.   HENT:      Head: Normocephalic.      Right Ear: External ear normal.      Left Ear: External ear normal.      Nose: Nose normal.      Mouth/Throat:      Mouth: Mucous membranes are moist.      Pharynx: Oropharynx is clear.   Eyes:      Pupils: Pupils are equal, round, and reactive to light.   Cardiovascular:       Rate and Rhythm: Normal rate and regular rhythm.      Pulses: Normal pulses.      Heart sounds: Normal heart sounds.   Pulmonary:      Effort: Pulmonary effort is normal.      Breath sounds: Normal breath sounds.   Abdominal:      General: Bowel sounds are normal.      Palpations: Abdomen is soft.   Musculoskeletal:         General: Normal range of motion.      Cervical back: Normal range of motion.   Skin:     General: Skin is warm and dry.   Neurological:      Mental Status: He is alert and oriented to person, place, and time.   Psychiatric:         Mood and Affect: Mood normal.         Behavior: Behavior normal.       Assessment:       1. Anticoagulant long-term use    2. History of pulmonary embolism        Plan:   Letter printed and saved into chart.   Advised to have Crestor and Franklin refilled with [facility]  RTC 12mo or prn    Problem List Items Addressed This Visit          Hematology    Anticoagulant long-term use - Primary (Chronic)    History of pulmonary embolism       Health Maintenance:  Health Maintenance Topics with due status: Not Due       Topic Last Completion Date    TETANUS VACCINE 10/18/2023    RSV Vaccine (Age 60+ and Pregnant patients) Not Due           [facility]   4/1/25

## 2025-05-15 DIAGNOSIS — Z12.11 COLON CANCER SCREENING: Primary | ICD-10-CM

## 2025-09-03 ENCOUNTER — TELEPHONE (OUTPATIENT)
Dept: GASTROENTEROLOGY | Facility: HOSPITAL | Age: 65
End: 2025-09-03
Payer: OTHER GOVERNMENT